# Patient Record
Sex: MALE | Race: WHITE | Employment: OTHER | URBAN - METROPOLITAN AREA
[De-identification: names, ages, dates, MRNs, and addresses within clinical notes are randomized per-mention and may not be internally consistent; named-entity substitution may affect disease eponyms.]

---

## 2020-08-10 ENCOUNTER — OFFICE VISIT (OUTPATIENT)
Dept: PRIMARY CARE CLINIC | Age: 65
End: 2020-08-10

## 2020-08-10 PROCEDURE — 99211 OFF/OP EST MAY X REQ PHY/QHP: CPT | Performed by: NURSE PRACTITIONER

## 2020-08-10 NOTE — PATIENT INSTRUCTIONS

## 2020-08-10 NOTE — PROGRESS NOTES
Manjit Suarez received a viral test for COVID-19. They were educated on isolation and quarantine as appropriate. For any symptoms, they were directed to seek care from their PCP, given contact information to establish with a doctor, directed to an urgent care or the emergency room.

## 2020-08-13 LAB
SARS-COV-2: NOT DETECTED
SOURCE: NORMAL

## 2020-08-17 ENCOUNTER — OFFICE VISIT (OUTPATIENT)
Dept: PRIMARY CARE CLINIC | Age: 65
End: 2020-08-17

## 2020-08-17 PROCEDURE — 99211 OFF/OP EST MAY X REQ PHY/QHP: CPT | Performed by: NURSE PRACTITIONER

## 2020-08-17 NOTE — PATIENT INSTRUCTIONS
Advance Care Planning  People with COVID-19 may have no symptoms, mild symptoms, such as fever, cough, and shortness of breath or they may have more severe illness, developing severe and fatal pneumonia. As a result, Advance Care Planning with attention to naming a health care decision maker (someone you trust to make healthcare decisions for you if you could not speak for yourself) and sharing other health care preferences is important BEFORE a possible health crisis. Please contact your Primary Care Provider to discuss Advance Care Planning. Preventing the Spread of Coronavirus Disease 2019 in Homes and Residential Communities  For the most recent information go to [email or web address]    Prevention steps for People with confirmed or suspected COVID-19 (including persons under investigation) who do not need to be hospitalized  and   People with confirmed COVID-19 who were hospitalized and determined to be medically stable to go home    Your healthcare provider and public health staff will evaluate whether you can be cared for at home. If it is determined that you do not need to be hospitalized and can be isolated at home, you will be monitored by staff from your local or state health department. You should follow the prevention steps below until a healthcare provider or local or state health department says you can return to your normal activities. Stay home except to get medical care  People who are mildly ill with COVID-19 are able to isolate at home during their illness. You should restrict activities outside your home, except for getting medical care. Do not go to work, school, or public areas. Avoid using public transportation, ride-sharing, or taxis. Separate yourself from other people and animals in your home  People: As much as possible, you should stay in a specific room and away from other people in your home.  Also, you should use a separate before eating or preparing food. If soap and water are not readily available, use an alcohol-based hand  with at least 60% alcohol, covering all surfaces of your hands and rubbing them together until they feel dry. Soap and water are the best option if hands are visibly dirty. Avoid touching your eyes, nose, and mouth with unwashed hands. Avoid sharing personal household items  You should not share dishes, drinking glasses, cups, eating utensils, towels, or bedding with other people or pets in your home. After using these items, they should be washed thoroughly with soap and water. Clean all high-touch surfaces everyday  High touch surfaces include counters, tabletops, doorknobs, bathroom fixtures, toilets, phones, keyboards, tablets, and bedside tables. Also, clean any surfaces that may have blood, stool, or body fluids on them. Use a household cleaning spray or wipe, according to the label instructions. Labels contain instructions for safe and effective use of the cleaning product including precautions you should take when applying the product, such as wearing gloves and making sure you have good ventilation during use of the product. Monitor your symptoms  Seek prompt medical attention if your illness is worsening (e.g., difficulty breathing). Before seeking care, call your healthcare provider and tell them that you have, or are being evaluated for, COVID-19. Put on a facemask before you enter the facility. These steps will help the healthcare providers office to keep other people in the office or waiting room from getting infected or exposed. Ask your healthcare provider to call the local or state health department. Persons who are placed under active monitoring or facilitated self-monitoring should follow instructions provided by their local health department or occupational health professionals, as appropriate. When working with your local health department check their available hours.   If you have a medical emergency and need to call 911, notify the dispatch personnel that you have, or are being evaluated for COVID-19. If possible, put on a facemask before emergency medical services arrive. Discontinuing home isolation  Patients with confirmed COVID-19 should remain under home isolation precautions until the risk of secondary transmission to others is thought to be low. The decision to discontinue home isolation precautions should be made on a case-by-case basis, in consultation with healthcare providers and state and local health departments.

## 2020-08-18 LAB — SARS-COV-2, NAA: NOT DETECTED

## 2020-11-03 ENCOUNTER — NURSE ONLY (OUTPATIENT)
Dept: INTERNAL MEDICINE CLINIC | Age: 65
End: 2020-11-03
Payer: COMMERCIAL

## 2020-11-03 PROCEDURE — 90694 VACC AIIV4 NO PRSRV 0.5ML IM: CPT | Performed by: INTERNAL MEDICINE

## 2020-11-03 PROCEDURE — 90471 IMMUNIZATION ADMIN: CPT | Performed by: INTERNAL MEDICINE

## 2020-11-03 NOTE — PROGRESS NOTES
Vaccine Information Sheet, \"Influenza - Inactivated\"  given to Gege Dempsey, or parent/legal guardian of  Gege Dempsey and verbalized understanding. Patient responses:    Have you ever had a reaction to a flu vaccine? No  Do you have any current illness? No  Have you ever had Guillian Philadelphia Syndrome? No  Do you have a serious allergy to any of the follow: Neomycin, Polymyxin, Thimerosal, eggs or egg products? No    Flu vaccine given per order. Please see immunization tab. Risks and benefits explained. Current VIS given.

## 2021-03-10 ENCOUNTER — OFFICE VISIT (OUTPATIENT)
Dept: PRIMARY CARE CLINIC | Age: 66
End: 2021-03-10
Payer: COMMERCIAL

## 2021-03-10 DIAGNOSIS — Z11.59 SCREENING FOR VIRAL DISEASE: Primary | ICD-10-CM

## 2021-03-10 PROCEDURE — 99211 OFF/OP EST MAY X REQ PHY/QHP: CPT | Performed by: NURSE PRACTITIONER

## 2021-03-10 NOTE — PROGRESS NOTES
Pedro Later received a viral test for COVID-19. They were educated on isolation and quarantine as appropriate. For any symptoms, they were directed to seek care from their PCP, given contact information to establish with a doctor, directed to an urgent care or the emergency room.

## 2021-03-10 NOTE — PATIENT INSTRUCTIONS

## 2021-03-11 LAB — SARS-COV-2: NOT DETECTED

## 2021-08-06 ENCOUNTER — TELEPHONE (OUTPATIENT)
Dept: INTERNAL MEDICINE CLINIC | Age: 66
End: 2021-08-06

## 2021-08-06 DIAGNOSIS — Z13.220 SCREENING FOR LIPOID DISORDERS: Primary | ICD-10-CM

## 2021-08-06 DIAGNOSIS — Z12.5 SPECIAL SCREENING FOR MALIGNANT NEOPLASM OF PROSTATE: ICD-10-CM

## 2021-08-06 DIAGNOSIS — K55.20 COLON AV MALFORMATION: ICD-10-CM

## 2021-08-06 NOTE — TELEPHONE ENCOUNTER
----- Message from Trista Broderick sent at 8/6/2021 12:28 PM EDT -----  Subject: Referral Request    QUESTIONS   Reason for referral request? Patient is scheduled with Dr. Latesha Tejeda and   would like to have his blood work completed prior to his appointment per   Dr. Ricardo Burleson. Please call Len Hoffman [phone]   Has the physician seen you for this condition before? No   Preferred Specialist (if applicable)? Do you already have an appointment scheduled? Additional Information for Provider?   ---------------------------------------------------------------------------  --------------  CALL BACK INFO  What is the best way for the office to contact you? OK to leave message on   voicemail  Preferred Call Back Phone Number?  [phone]

## 2021-08-06 NOTE — TELEPHONE ENCOUNTER
Dr. Ana Maria Mcnair am showing that this patient is not on Medicare but a commercial insurance plan. Therefore, I have kept him scheduled for a Physical with 60 min.   Also informed patient that his blood work orders are in [facility]

## 2021-08-06 NOTE — TELEPHONE ENCOUNTER
Please call patient. Order for fasting laboratory studies in epic obtain a few days before the office visit.   At the age of 77 is he eligible for a \"history and physical\" or only atypical routine Medicare checkup for which he does not need an hour but rather suggest 40 min at the most  Dr. Dagoberto Taylor

## 2021-09-22 ENCOUNTER — HOSPITAL ENCOUNTER (OUTPATIENT)
Age: 66
Discharge: HOME | End: 2021-09-22
Payer: COMMERCIAL

## 2021-09-22 DIAGNOSIS — Z12.5 SPECIAL SCREENING FOR MALIGNANT NEOPLASM OF PROSTATE: ICD-10-CM

## 2021-09-22 DIAGNOSIS — K55.20 COLON AV MALFORMATION: ICD-10-CM

## 2021-09-22 DIAGNOSIS — Z13.220 SCREENING FOR LIPOID DISORDERS: ICD-10-CM

## 2021-09-22 LAB
A/G RATIO: 1.3 (ref 1.1–2.2)
ALBUMIN SERPL-MCNC: 4.2 G/DL (ref 3.4–5)
ALP BLD-CCNC: 124 U/L (ref 40–129)
ALT SERPL-CCNC: 44 U/L (ref 10–40)
ANION GAP SERPL CALCULATED.3IONS-SCNC: 15 MMOL/L (ref 3–16)
AST SERPL-CCNC: 46 U/L (ref 15–37)
BILIRUB SERPL-MCNC: 0.9 MG/DL (ref 0–1)
BUN BLDV-MCNC: 12 MG/DL (ref 7–20)
CALCIUM SERPL-MCNC: 9.7 MG/DL (ref 8.3–10.6)
CHLORIDE BLD-SCNC: 99 MMOL/L (ref 99–110)
CHOLESTEROL, TOTAL: 282 MG/DL (ref 0–199)
CO2: 25 MMOL/L (ref 21–32)
CREAT SERPL-MCNC: 0.8 MG/DL (ref 0.8–1.3)
GFR AFRICAN AMERICAN: >60
GFR NON-AFRICAN AMERICAN: >60
GLOBULIN: 3.3 G/DL
GLUCOSE BLD-MCNC: 125 MG/DL (ref 70–99)
HCT VFR BLD CALC: 46.7 % (ref 40.5–52.5)
HDLC SERPL-MCNC: 71 MG/DL (ref 40–60)
HEMOGLOBIN: 16 G/DL (ref 13.5–17.5)
LDL CHOLESTEROL CALCULATED: 174 MG/DL
MCH RBC QN AUTO: 33.9 PG (ref 26–34)
MCHC RBC AUTO-ENTMCNC: 34.3 G/DL (ref 31–36)
MCV RBC AUTO: 98.9 FL (ref 80–100)
PDW BLD-RTO: 12.6 % (ref 12.4–15.4)
PLATELET # BLD: 232 K/UL (ref 135–450)
PMV BLD AUTO: 8 FL (ref 5–10.5)
POTASSIUM SERPL-SCNC: 4.5 MMOL/L (ref 3.5–5.1)
PROSTATE SPECIFIC ANTIGEN: 2.93 NG/ML (ref 0–4)
RBC # BLD: 4.72 M/UL (ref 4.2–5.9)
SODIUM BLD-SCNC: 139 MMOL/L (ref 136–145)
TOTAL PROTEIN: 7.5 G/DL (ref 6.4–8.2)
TRIGL SERPL-MCNC: 187 MG/DL (ref 0–150)
VLDLC SERPL CALC-MCNC: 37 MG/DL
WBC # BLD: 7.2 K/UL (ref 4–11)

## 2021-09-22 PROCEDURE — 36415 COLL VENOUS BLD VENIPUNCTURE: CPT

## 2021-09-22 PROCEDURE — 80053 COMPREHEN METABOLIC PANEL: CPT

## 2021-09-22 PROCEDURE — 80061 LIPID PANEL: CPT

## 2021-09-22 PROCEDURE — 85027 COMPLETE CBC AUTOMATED: CPT

## 2021-09-22 PROCEDURE — 84153 ASSAY OF PSA TOTAL: CPT

## 2021-10-11 ENCOUNTER — HOSPITAL ENCOUNTER (OUTPATIENT)
Age: 66
Discharge: HOME | End: 2021-10-11
Payer: COMMERCIAL

## 2021-10-11 ENCOUNTER — OFFICE VISIT (OUTPATIENT)
Dept: INTERNAL MEDICINE CLINIC | Age: 66
End: 2021-10-11
Payer: COMMERCIAL

## 2021-10-11 VITALS
BODY MASS INDEX: 31.86 KG/M2 | OXYGEN SATURATION: 98 % | HEART RATE: 100 BPM | SYSTOLIC BLOOD PRESSURE: 134 MMHG | DIASTOLIC BLOOD PRESSURE: 86 MMHG | HEIGHT: 67 IN | WEIGHT: 203 LBS

## 2021-10-11 DIAGNOSIS — R53.83 FATIGUE, UNSPECIFIED TYPE: ICD-10-CM

## 2021-10-11 DIAGNOSIS — Z23 NEED FOR PROPHYLACTIC VACCINATION AGAINST STREPTOCOCCUS PNEUMONIAE (PNEUMOCOCCUS): ICD-10-CM

## 2021-10-11 DIAGNOSIS — N52.9 ERECTILE DYSFUNCTION, UNSPECIFIED ERECTILE DYSFUNCTION TYPE: ICD-10-CM

## 2021-10-11 DIAGNOSIS — Z23 NEED FOR INFLUENZA VACCINATION: ICD-10-CM

## 2021-10-11 DIAGNOSIS — R73.01 IFG (IMPAIRED FASTING GLUCOSE): ICD-10-CM

## 2021-10-11 DIAGNOSIS — Z00.00 ANNUAL PHYSICAL EXAM: Primary | ICD-10-CM

## 2021-10-11 LAB
BILIRUBIN, POC: ABNORMAL
BLOOD URINE, POC: NEGATIVE
CLARITY, POC: CLEAR
COLOR, POC: ABNORMAL
GLUCOSE URINE, POC: NEGATIVE
KETONES, POC: ABNORMAL
LEUKOCYTE EST, POC: NEGATIVE
NITRITE, POC: NEGATIVE
PH, POC: 6
PROTEIN, POC: + 30
SPECIFIC GRAVITY, POC: 1.02
UROBILINOGEN, POC: NORMAL

## 2021-10-11 PROCEDURE — 84403 ASSAY OF TOTAL TESTOSTERONE: CPT

## 2021-10-11 PROCEDURE — 90732 PPSV23 VACC 2 YRS+ SUBQ/IM: CPT | Performed by: INTERNAL MEDICINE

## 2021-10-11 PROCEDURE — 81002 URINALYSIS NONAUTO W/O SCOPE: CPT | Performed by: INTERNAL MEDICINE

## 2021-10-11 PROCEDURE — 84436 ASSAY OF TOTAL THYROXINE: CPT

## 2021-10-11 PROCEDURE — 36415 COLL VENOUS BLD VENIPUNCTURE: CPT

## 2021-10-11 PROCEDURE — 90694 VACC AIIV4 NO PRSRV 0.5ML IM: CPT | Performed by: INTERNAL MEDICINE

## 2021-10-11 PROCEDURE — 99397 PER PM REEVAL EST PAT 65+ YR: CPT | Performed by: INTERNAL MEDICINE

## 2021-10-11 PROCEDURE — 90471 IMMUNIZATION ADMIN: CPT | Performed by: INTERNAL MEDICINE

## 2021-10-11 PROCEDURE — 90472 IMMUNIZATION ADMIN EACH ADD: CPT | Performed by: INTERNAL MEDICINE

## 2021-10-11 PROCEDURE — 84443 ASSAY THYROID STIM HORMONE: CPT

## 2021-10-11 RX ORDER — SILDENAFIL 50 MG/1
TABLET, FILM COATED ORAL
Qty: 10 TABLET | Refills: 1 | Status: SHIPPED | OUTPATIENT
Start: 2021-10-11 | End: 2022-02-01

## 2021-10-11 SDOH — ECONOMIC STABILITY: FOOD INSECURITY: WITHIN THE PAST 12 MONTHS, THE FOOD YOU BOUGHT JUST DIDN'T LAST AND YOU DIDN'T HAVE MONEY TO GET MORE.: NEVER TRUE

## 2021-10-11 SDOH — ECONOMIC STABILITY: FOOD INSECURITY: WITHIN THE PAST 12 MONTHS, YOU WORRIED THAT YOUR FOOD WOULD RUN OUT BEFORE YOU GOT MONEY TO BUY MORE.: NEVER TRUE

## 2021-10-11 ASSESSMENT — PATIENT HEALTH QUESTIONNAIRE - PHQ9
SUM OF ALL RESPONSES TO PHQ QUESTIONS 1-9: 0
1. LITTLE INTEREST OR PLEASURE IN DOING THINGS: 0
SUM OF ALL RESPONSES TO PHQ QUESTIONS 1-9: 0
SUM OF ALL RESPONSES TO PHQ9 QUESTIONS 1 & 2: 0
SUM OF ALL RESPONSES TO PHQ QUESTIONS 1-9: 0
2. FEELING DOWN, DEPRESSED OR HOPELESS: 0

## 2021-10-11 ASSESSMENT — SOCIAL DETERMINANTS OF HEALTH (SDOH): HOW HARD IS IT FOR YOU TO PAY FOR THE VERY BASICS LIKE FOOD, HOUSING, MEDICAL CARE, AND HEATING?: NOT HARD AT ALL

## 2021-10-11 NOTE — PROGRESS NOTES
History and Physical      Elma Brito  YOB: 1955    Date of Service:  10/11/2021    Chief Complaint:   Elma Brito is a 74 y.o. male who presents for complete physical examination. HPI:  Patient here for history physical exam.  Review last office visit with me: 11/9/2016. Reviewed colonoscopy most recent 11/12/2018 notable for polyps, tubular adenoma. Diverticulosis. AV malformation redo 5 years Dr. Soha Pinedo,  Health maintenance: Hepatitis C, shingles, influenza vaccine, pneumonia 23, Covid vaccine. Reviewed laboratory data 9/22/2021: Chemistry panel: Glucose: 125. ALT: 44. AST: 46. Total cholesterol: 282. LDL cholesterol 174. CBC unremarkable. PSA 2.93. Patient complains of a decrease in energy in the morning at times will fall asleep watching TV in the evening go to bed around midnight up about 7:15 AM.  Back to bed about 930 not really sleeping he states. Patient states he does snore uses an over-the-counter \"Snore Stop\". Denies apneic spells. Denies daytime sleepiness. Patient does drink 2 glasses of wine in the evening. Complains of erectile dysfunction: Has had difficulty with lack of firmness now seems to be worse over the last 6 to 9 months. Of significance is alcohol consumption in the evening. Patient feels his libido is intact.     Wt Readings from Last 3 Encounters:   10/11/21 203 lb (92.1 kg)   11/09/16 194 lb 9.6 oz (88.3 kg)   06/01/16 191 lb (86.6 kg)     BP Readings from Last 3 Encounters:   10/11/21 134/86   11/09/16 128/74   06/01/16 122/78       Patient Active Problem List   Diagnosis    FH: colon cancer    S/P tonsillectomy    Other affections of shoulder region, not elsewhere classified    Chronic back pain       Preventive Care:  Health Maintenance   Topic Date Due    Hepatitis C screen  Never done    Shingles Vaccine (1 of 2) Never done    Diabetes screen  07/30/2018    DTaP/Tdap/Td vaccine (2 - Td or Tdap) 06/01/2026    Lipid screen 09/22/2026    Colon cancer screen colonoscopy  11/12/2028    Flu vaccine  Completed    Pneumococcal 65+ years Vaccine  Completed    COVID-19 Vaccine  Completed    Hepatitis A vaccine  Aged Out    Hepatitis B vaccine  Aged Out    Hib vaccine  Aged Out    Meningococcal (ACWY) vaccine  Aged Out      Self-testicular exams:  n  Sexual activity: Y  Last eye exam:  2021  Exercise:  n  Seatbelt use:  y  Lipid panel:    Lab Results   Component Value Date    CHOL 282 (H) 09/22/2021    TRIG 187 (H) 09/22/2021    HDL 71 (H) 09/22/2021    LDLCALC 174 (H) 09/22/2021       Living will:  n      Immunization History   Administered Date(s) Administered    COVID-19, Pfizer, PF, 30mcg/0.3mL 02/20/2021, 03/13/2021    Influenza Virus Vaccine 11/20/2015    Influenza, Roj[place], IM, (6 mo and older Fluzone, Flulaval, Fluarix and 3 yrs and older Afluria) 11/09/2016    Influenza, Quadv, adjuvanted, 65 yrs +, IM, PF (Fluad) 11/03/2020, 10/11/2021    Pneumococcal Polysaccharide ([id number]) 10/11/2021    Tdap (Boostrix, Adacel) 06/01/2016       No Known Allergies  Outpatient Medications Marked as Taking for the 10/11/21 encounter (Office Visit) with Bee Miller MD   Medication Sig Dispense Refill    sildenafil (VIAGRA) 50 MG tablet 1 to 2 pills as needed for ED 10 tablet 1    folic acid (FOLVITE) 785 MCG tablet Take 400 mcg by mouth daily.  vitamin B-12 (CYANOCOBALAMIN) 1000 MCG tablet Take 1,000 mcg by mouth daily.  Echinacea 400 MG CAPS Take  by mouth.  Ascorbic Acid (VITAMIN C) 500 MG tablet Take 1,000 mg by mouth daily. Past Medical History:   Diagnosis Date    Chronic back pain     Sees Chirop    Colonic polyp 2003     Past Surgical History:   Procedure Laterality Date    COLONOSCOPY  2003,2006    COLONOSCOPY  08/03/2012    Diverticulosis A-V malformation Redo 5 yrs    COLONOSCOPY N/A 11/12/2018    Dr. Yogesh Zaragoza. Tubular adenoma.   Redo 5 years     Family History   Problem Relation Age of Onset  Colon Cancer Father      Social History     Socioeconomic History    Marital status:      Spouse name: Vijaya Moody Number of children: 3    Years of education: Not on file    Highest education level: Not on file   Occupational History    Occupation:      Comment: Insurance    Tobacco Use    Smoking status: Never Smoker    Smokeless tobacco: Never Used   Substance and Sexual Activity    Alcohol use: Yes     Alcohol/week: 2.0 standard drinks     Types: 2 Glasses of wine per week    Drug use: Not on file    Sexual activity: Not on file   Other Topics Concern    Not on file   Social History Narrative    Not on file     Social Determinants of Health     Financial Resource Strain: Low Risk     Difficulty of Paying Living Expenses: Not hard at all   Food Insecurity: No Food Insecurity    Worried About [address] in the Last Year: Never true    [address] in the Last Year: Never true   Transportation Needs:     Lack of Transportation (Medical):      Lack of Transportation (Non-Medical):    Physical Activity:     Days of Exercise per Week:     Minutes of Exercise per Session:    Stress:     Feeling of Stress :    Social Connections:     Frequency of Communication with Friends and Family:     Frequency of Social Gatherings with Friends and Family:     Attends [employer] Services:     Active Member of Clubs or Organizations:     Attends Club or Organization Meetings:     Marital Status:    Intimate Partner Violence:     Fear of Current or Ex-Partner:     Emotionally Abused:     Physically Abused:     Sexually Abused:        Review of Systems:  Constitutional: negative  HENT: negative  EYES:  Closed Angle glaucoma Laser Tx  Respiratory: negative  Cardiovasular :Negative  Gastrointestinal: negative  Endocrine: negative  Musculoskeletal: negative  Skin: negative  Allergic/Immunological: negative  Hematological: negative  Psychiatric/Behavorial: negative  : ED  Renal: negative  CNS: negative    Physical Exam:   Vitals:    10/11/21 1306   BP: 134/86   Site: Right Upper Arm   Position: Sitting   Cuff Size: Large Adult   Pulse: 100   SpO2: 98%   Weight: 203 lb (92.1 kg)   Height: 5' 7\" (1.702 m)     Body mass index is 31.79 kg/m². Constitutional: He is oriented to person, place, and time. He appears well-developed and well-nourished. No distress. HEENT:   Head: Normocephalic and atraumatic. Right Ear: Tympanic membrane, external ear and ear canal normal.   Left Ear: Tympanic membrane, external ear and ear canal normal.   Nose: Nose normal.   Mouth/Throat: Oropharynx is clear and moist and mucous membranes are normal. No oropharyngeal exudate or posterior oropharyngeal erythema. He has no cervical adenopathy. Eyes: Conjunctivae and extraocular motions are normal. Pupils are equal, round, and reactive to light. Neck: Full passive range of motion without pain. Neck supple. No JVD present. Carotid bruit is not present. No mass and no thyromegaly present. Cardiovascular: Normal rate, regular rhythm, normal heart sounds and intact distal pulses. Exam reveals no gallop and no friction rub. DURGA murmur heard. Pulmonary/Chest: Effort normal and breath sounds normal. No respiratory distress. He has no wheezes, rhonchi or rales. Abdominal: Midline diastases. Not tender. Obese. Soft, non-tender. Bowel sounds and aorta are normal. There is no organomegaly, mass or bruit. Genitourinary: Not examined. Musculoskeletal: Normal range of motion, no synovitis. He exhibits bilateral ankle edema. Neurological: He is alert and oriented to person, place, and time. He has normal reflexes. No cranial nerve deficit. Coordination normal.   Skin: Skin is warm, dry and intact. No suspicious lesions are noted. Psychiatric: He has a normal mood and affect.  His speech is normal and behavior is normal. Judgment, cognition and memory are normal.   Testing: Urinalysis: +30 protein, small bilirubin trace ketones otherwise unremarkable. Results for orders placed or performed in visit on 10/11/21   POCT Urinalysis no Micro   Result Value Ref Range    Color, UA YAHAIRA     Clarity, UA CLEAR     Glucose, UA POC NEGATIVE     Bilirubin, UA SMALL     Ketones, UA TRACE     Spec Grav, UA 1.025     Blood, UA POC NEGATIVE     pH, UA 6.0     Protein, UA POC + 30     Urobilinogen, UA NORMAL     Leukocytes, UA NEGATIVE     Nitrite, UA NEGATIVE      Assessment/Plan:  Patient Active Problem List   Diagnosis    FH: colon cancer    S/P tonsillectomy    Other affections of shoulder region, not elsewhere classified    Chronic back pain     Kamilla Samano was seen today for annual exam.    Diagnoses and all orders for this visit:    Annual physical exam  -     POCT Urinalysis no Micro: Performed in office. Unremarkable. Erectile dysfunction, unspecified erectile dysfunction type  -     Testosterone male; Future call me for results  -     sildenafil (VIAGRA) 50 MG tablet; 1 to 2 pills as needed for ED discussed taking Viagra before encountering sexual stimulation and discussed window opportunity and possible side effects    Fatigue, unspecified type:                Evaluate for fatigue. Patient declined sleep study.  -     T4; Future  -     TSH with Reflex; Future    IFG (impaired fasting glucose)  -     Hemoglobin A1C; Future  -     Lipid Panel; Future  -     Comprehensive Metabolic Panel; Future            Discussed low sugar low-carb weight reduction diet and referral to nurse practitioner for teaching/instruction    Need for influenza vaccination                 Health maintenance. Influenza vaccine given today    Need for prophylactic vaccination against Streptococcus pneumoniae (pneumococcus)               Health maintenance.   -     Pneumococcal polysaccharide vaccine 23-valent greater than or equal to 1yo subcutaneous/IM    Other orders  -     INFLUENZA, QUADV, ADJUVANTED, 65 YRS =, IM, PF, PREFILL SYR, 0.5ML (FLUAD)

## 2021-10-12 ENCOUNTER — TELEPHONE (OUTPATIENT)
Dept: INTERNAL MEDICINE CLINIC | Age: 66
End: 2021-10-12

## 2021-10-12 LAB
T4 TOTAL: 6 UG/DL (ref 4.5–10.9)
TSH REFLEX: 1.31 UIU/ML (ref 0.27–4.2)

## 2021-10-13 NOTE — TELEPHONE ENCOUNTER
Please call patient. From 10/11/2021 labs. Thyroid function test were normal.  Please check on testosterone level result?   Dr. Jacey Leal

## 2021-10-14 LAB — TESTOSTERONE TOTAL: 366 NG/DL (ref 220–1000)

## 2022-02-01 DIAGNOSIS — N52.9 ERECTILE DYSFUNCTION, UNSPECIFIED ERECTILE DYSFUNCTION TYPE: ICD-10-CM

## 2022-02-01 RX ORDER — SILDENAFIL 50 MG/1
TABLET, FILM COATED ORAL
Qty: 10 TABLET | Refills: 1 | Status: SHIPPED | OUTPATIENT
Start: 2022-02-01 | End: 2022-09-26

## 2022-02-01 NOTE — TELEPHONE ENCOUNTER
Refill request for Sildenafil medication.      Name of Aster      Last visit - 10/11/21     Pending visit - none    Last refill -10/11/21      Medication Contract signed -   Last Oarrs ran-         Additional Comments

## 2022-09-25 DIAGNOSIS — N52.9 ERECTILE DYSFUNCTION, UNSPECIFIED ERECTILE DYSFUNCTION TYPE: ICD-10-CM

## 2022-09-26 RX ORDER — SILDENAFIL 50 MG/1
TABLET, FILM COATED ORAL
Qty: 10 TABLET | Refills: 0 | Status: SHIPPED | OUTPATIENT
Start: 2022-09-26

## 2022-09-26 NOTE — TELEPHONE ENCOUNTER
Refill request for sildenafil medication.      Name of [facility]- [facility]      Last visit - 10/11/21     Pending visit - none    Last refill -2/1/22      Medication Contract signed -   Last Oarrs ran-         Additional Comments

## 2023-05-18 ENCOUNTER — OFFICE VISIT (OUTPATIENT)
Dept: INTERNAL MEDICINE CLINIC | Age: 68
End: 2023-05-18
Payer: COMMERCIAL

## 2023-05-18 VITALS
DIASTOLIC BLOOD PRESSURE: 78 MMHG | HEART RATE: 99 BPM | OXYGEN SATURATION: 97 % | WEIGHT: 211 LBS | HEIGHT: 67 IN | SYSTOLIC BLOOD PRESSURE: 124 MMHG | TEMPERATURE: 97.5 F | BODY MASS INDEX: 33.12 KG/M2

## 2023-05-18 DIAGNOSIS — E66.9 CLASS 1 OBESITY WITHOUT SERIOUS COMORBIDITY WITH BODY MASS INDEX (BMI) OF 33.0 TO 33.9 IN ADULT, UNSPECIFIED OBESITY TYPE: ICD-10-CM

## 2023-05-18 DIAGNOSIS — Z00.00 ENCOUNTER FOR WELL ADULT EXAM WITHOUT ABNORMAL FINDINGS: Primary | ICD-10-CM

## 2023-05-18 DIAGNOSIS — E78.2 MIXED HYPERLIPIDEMIA: ICD-10-CM

## 2023-05-18 DIAGNOSIS — Z00.00 PREVENTATIVE HEALTH CARE: ICD-10-CM

## 2023-05-18 DIAGNOSIS — R73.01 IFG (IMPAIRED FASTING GLUCOSE): ICD-10-CM

## 2023-05-18 DIAGNOSIS — R06.83 SNORING: ICD-10-CM

## 2023-05-18 DIAGNOSIS — N52.9 ERECTILE DYSFUNCTION, UNSPECIFIED ERECTILE DYSFUNCTION TYPE: ICD-10-CM

## 2023-05-18 PROCEDURE — 99397 PER PM REEVAL EST PAT 65+ YR: CPT | Performed by: STUDENT IN AN ORGANIZED HEALTH CARE EDUCATION/TRAINING PROGRAM

## 2023-05-18 RX ORDER — TADALAFIL 10 MG/1
10 TABLET ORAL PRN
Qty: 30 TABLET | Refills: 3 | Status: SHIPPED | OUTPATIENT
Start: 2023-05-18

## 2023-05-18 SDOH — ECONOMIC STABILITY: HOUSING INSECURITY
IN THE LAST 12 MONTHS, WAS THERE A TIME WHEN YOU DID NOT HAVE A STEADY PLACE TO SLEEP OR SLEPT IN A SHELTER (INCLUDING NOW)?: NO

## 2023-05-18 SDOH — ECONOMIC STABILITY: INCOME INSECURITY: HOW HARD IS IT FOR YOU TO PAY FOR THE VERY BASICS LIKE FOOD, HOUSING, MEDICAL CARE, AND HEATING?: NOT HARD AT ALL

## 2023-05-18 SDOH — ECONOMIC STABILITY: FOOD INSECURITY: WITHIN THE PAST 12 MONTHS, YOU WORRIED THAT YOUR FOOD WOULD RUN OUT BEFORE YOU GOT MONEY TO BUY MORE.: NEVER TRUE

## 2023-05-18 SDOH — ECONOMIC STABILITY: FOOD INSECURITY: WITHIN THE PAST 12 MONTHS, THE FOOD YOU BOUGHT JUST DIDN'T LAST AND YOU DIDN'T HAVE MONEY TO GET MORE.: NEVER TRUE

## 2023-05-18 ASSESSMENT — PATIENT HEALTH QUESTIONNAIRE - PHQ9
SUM OF ALL RESPONSES TO PHQ9 QUESTIONS 1 & 2: 0
2. FEELING DOWN, DEPRESSED OR HOPELESS: 0
1. LITTLE INTEREST OR PLEASURE IN DOING THINGS: 0
SUM OF ALL RESPONSES TO PHQ QUESTIONS 1-9: 0

## 2023-05-18 NOTE — PROGRESS NOTES
Well Adult Note  Name: Alcira Haas Date: 2023   MRN: [id number] Sex: Male   Age: 69 y.o. Ethnicity: Non- / Non    : 1955 Race: White (non-)      Sherryle Bucco is here for well adult exam.  History:  -Patient is a 64-year-old male presenting to establish care with me. He does not have any significant past medical history. Does have obesity and has not obtained labs for at least 1 year. On previous lab review noted hyperlipidemia but he is not on any medications. Does snore and feels tired occasionally in the morning but has not obtained a sleep study yet. He is on Viagra and has noted weaning effects on it. Has tried Cialis in the past with good response. He needs refills on it. Review of Systems  14 point ROS negative except as above  No Known Allergies      Prior to Visit Medications    Medication Sig Taking? Authorizing Provider   tadalafil (CIALIS) 10 MG tablet Take 1 tablet by mouth as needed for Erectile Dysfunction Yes Solange Cornelius MD   triamcinolone (KENALOG) 0.1 % cream Apply topically 2 times daily. Patient not taking: Reported on 10/11/2021  PORTILLO Montesinos - CNP   folic acid (FOLVITE) 418 MCG tablet Take 400 mcg by mouth daily. Historical Provider, MD   vitamin B-12 (CYANOCOBALAMIN) 1000 MCG tablet Take 1,000 mcg by mouth daily. Historical Provider, MD   GINKGO 60 MG TABS Take  by mouth. Patient not taking: Reported on 10/11/2021  Historical Provider, MD   Echinacea 400 MG CAPS Take  by mouth. Historical Provider, MD   Ascorbic Acid (VITAMIN C) 500 MG tablet Take 1,000 mg by mouth daily. Historical Provider, MD         Past Medical History:   Diagnosis Date    Chronic back pain     Sees Chirop    Colonic polyp        Past Surgical History:   Procedure Laterality Date    COLONOSCOPY  ,    COLONOSCOPY  2012    Diverticulosis A-V malformation Redo 5 yrs    COLONOSCOPY N/A 2018    Dr. Snehal Frey.   Tubular

## 2023-07-07 ENCOUNTER — TELEPHONE (OUTPATIENT)
Dept: INTERNAL MEDICINE CLINIC | Age: 68
End: 2023-07-07

## 2023-07-07 NOTE — TELEPHONE ENCOUNTER
Patient's insurance [employer]qu[employer] is calling today stating there needs to be a prior authorization done.     The number to call for the PA is [phone]

## 2023-08-21 ENCOUNTER — OFFICE VISIT (OUTPATIENT)
Dept: PULMONOLOGY | Age: 68
End: 2023-08-21
Payer: COMMERCIAL

## 2023-08-21 VITALS
HEIGHT: 67 IN | DIASTOLIC BLOOD PRESSURE: 74 MMHG | HEART RATE: 96 BPM | OXYGEN SATURATION: 96 % | SYSTOLIC BLOOD PRESSURE: 124 MMHG | WEIGHT: 200 LBS | BODY MASS INDEX: 31.39 KG/M2 | RESPIRATION RATE: 16 BRPM

## 2023-08-21 DIAGNOSIS — R53.83 OTHER FATIGUE: ICD-10-CM

## 2023-08-21 DIAGNOSIS — E66.9 OBESITY (BMI 30-39.9): ICD-10-CM

## 2023-08-21 DIAGNOSIS — R06.83 SNORING: Primary | ICD-10-CM

## 2023-08-21 DIAGNOSIS — G47.30 OBSERVED SLEEP APNEA: ICD-10-CM

## 2023-08-21 PROCEDURE — 99204 OFFICE O/P NEW MOD 45 MIN: CPT | Performed by: NURSE PRACTITIONER

## 2023-08-21 RX ORDER — SOD SULF/POT CHLORIDE/MAG SULF 1.479 G
TABLET ORAL
COMMUNITY
Start: 2023-07-24

## 2023-08-21 ASSESSMENT — SLEEP AND FATIGUE QUESTIONNAIRES
ESS TOTAL SCORE: 0
HOW LIKELY ARE YOU TO NOD OFF OR FALL ASLEEP WHILE SITTING AND READING: 0
HOW LIKELY ARE YOU TO NOD OFF OR FALL ASLEEP WHILE SITTING AND TALKING TO SOMEONE: 0
HOW LIKELY ARE YOU TO NOD OFF OR FALL ASLEEP IN A CAR, WHILE STOPPED FOR A FEW MINUTES IN TRAFFIC: 0
HOW LIKELY ARE YOU TO NOD OFF OR FALL ASLEEP WHILE WATCHING TV: 0
HOW LIKELY ARE YOU TO NOD OFF OR FALL ASLEEP WHILE SITTING QUIETLY AFTER LUNCH WITHOUT ALCOHOL: 0
NECK CIRCUMFERENCE (INCHES): 17
HOW LIKELY ARE YOU TO NOD OFF OR FALL ASLEEP WHILE LYING DOWN TO REST IN THE AFTERNOON WHEN CIRCUMSTANCES PERMIT: 0
HOW LIKELY ARE YOU TO NOD OFF OR FALL ASLEEP WHEN YOU ARE A PASSENGER IN A CAR FOR AN HOUR WITHOUT A BREAK: 0
HOW LIKELY ARE YOU TO NOD OFF OR FALL ASLEEP WHILE SITTING INACTIVE IN A PUBLIC PLACE: 0

## 2023-08-21 NOTE — PROGRESS NOTES
Patient ID: Shani Gonsales is a 69 y.o. male who is being seen today for   Chief Complaint   Patient presents with    New Patient     Referred by Radha     Sngabe     Referring: Dr. Juanjo Ybarra    HPI:   Shani Gonsales is a 69 y.o. male in office for snoring evaluation. Patient reports snoring at night for the past 10 years, better with \"snore stop spray\". States tried mouthguard for snoring but that did not help. Worse in supine position. Wakes self snoring. Has witnessed apnea. Mostly restorative sleep. +dry mouth upon awakening. Some fatigue and tiredness during the day. No history of sleep apnea. Bedtime 1030-11 pm and rise time is 930-10 am. It takes few minutes to fall asleep. 0-1 nocturia. Wakes up 1 times at night. It takes few minutes to fall back a sleep. Takes no nap during the day. No headache in am. No car wrecks or near wrecks because of the sleepiness. No nodding off while driving. Gained 30 pounds in the past 1-2 years. No forgetfulness or decreased concentration. at night. Drinks 0-1 caffinated beverages per day. 1-2 glass of wine a day. No restless feelings in legs at night. No loss of muscle strength when angry or laugh. No hallucination when dozing off or waking up from sleep. No paralysis upon awakening from sleep or going to sleep. No teeth grinding. No nightmares. No sleep walking. No night time panic attacks. No narcotics. No drug abuse. No history of depression. No history of anxiety. No history of atrial fibrillation. No history of DM. No history of HTN. No history of ischemic heart disease. No history of stroke. ESS is 0 . No smoking. No known FH for RLS or narcolepsy.  +FH for ANGELY- sister    Sleep Medicine 8/21/2023   Sitting and reading 0   Watching TV 0   Sitting, inactive in a public place (e.g. a theatre or a meeting) 0   As a passenger in a car for an hour without a break 0   Lying down to rest in the afternoon when circumstances permit 0   Sitting and talking to

## 2023-08-21 NOTE — PATIENT INSTRUCTIONS
[place]  Oxygen/[phone]  [address]  [facility], [address] [address]  Oxygen/PAP [address], [address]  **[place] inside [facility]  ([address])  Oxygen/PAP/NIV   [phone]  [phone] [address] [place], [address] [facility]  **[address] [facility]  Oxygen/ [facility] [address] [facility]  [phone] [address]   [address]  [place], [address] [facility]  **NE of [address] [place]  [phone] [address]  [place], [address]   Patient Aids -- [facility]   Oxygen/PAP/NIV [phone] [address] [place] [address]  [facility], [address]   Patient Aids Jackie Pert  Oxygen/[phone]  Option 4 [phone] [address]  Jackie Pert, [address] [place]   [facility] [phone] [facility] [address] [place], [place], [address] [facility]  Oxygen/PAP/NIV [place] [address]  Oxygen/PAP/NIV [facility] [address] [facility], [address]  Oxygen/PAP/NIV [address] [facility] [address]  [phone] [address] [place], [address]    [place] Sport  Oxygen/PAP/ [address] [facility] [address], [address]   [facility]  (formerly [facility])  CPAP only [address], [address]  **[address] [facility], [phone]    1-800 CPAP (store) [address]  [phone]    [id number] [facility] [address]     [facility] [place]

## 2023-08-29 ENCOUNTER — HOSPITAL ENCOUNTER (OUTPATIENT)
Dept: SLEEP CENTER | Age: 68
Discharge: HOME | End: 2023-08-31
Payer: COMMERCIAL

## 2023-08-29 DIAGNOSIS — G47.30 OBSERVED SLEEP APNEA: ICD-10-CM

## 2023-08-29 DIAGNOSIS — R53.83 OTHER FATIGUE: ICD-10-CM

## 2023-08-29 DIAGNOSIS — R06.83 SNORING: ICD-10-CM

## 2023-08-29 DIAGNOSIS — E66.9 OBESITY (BMI 30-39.9): ICD-10-CM

## 2023-08-30 PROCEDURE — 95806 SLEEP STUDY UNATT&RESP EFFT: CPT

## 2023-08-31 ENCOUNTER — HOSPITAL ENCOUNTER (OUTPATIENT)
Age: 68
Discharge: HOME | End: 2023-08-31
Payer: COMMERCIAL

## 2023-08-31 DIAGNOSIS — R73.01 IFG (IMPAIRED FASTING GLUCOSE): ICD-10-CM

## 2023-08-31 DIAGNOSIS — Z00.00 PREVENTATIVE HEALTH CARE: ICD-10-CM

## 2023-08-31 DIAGNOSIS — E78.2 MIXED HYPERLIPIDEMIA: ICD-10-CM

## 2023-08-31 LAB
ALBUMIN SERPL-MCNC: 4.3 G/DL (ref 3.4–5)
ALBUMIN/GLOB SERPL: 1.4 {RATIO} (ref 1.1–2.2)
ALP SERPL-CCNC: 126 U/L (ref 40–129)
ALT SERPL-CCNC: 38 U/L (ref 10–40)
ANION GAP SERPL CALCULATED.3IONS-SCNC: 13 MMOL/L (ref 3–16)
AST SERPL-CCNC: 47 U/L (ref 15–37)
BASOPHILS # BLD: 0.1 K/UL (ref 0–0.2)
BASOPHILS NFR BLD: 1 %
BILIRUB SERPL-MCNC: 0.4 MG/DL (ref 0–1)
BUN SERPL-MCNC: 10 MG/DL (ref 7–20)
CALCIUM SERPL-MCNC: 9.5 MG/DL (ref 8.3–10.6)
CHLORIDE SERPL-SCNC: 103 MMOL/L (ref 99–110)
CHOLEST SERPL-MCNC: 257 MG/DL (ref 0–199)
CO2 SERPL-SCNC: 25 MMOL/L (ref 21–32)
CREAT SERPL-MCNC: 0.6 MG/DL (ref 0.8–1.3)
DEPRECATED RDW RBC AUTO: 13.2 % (ref 12.4–15.4)
EOSINOPHIL # BLD: 0.3 K/UL (ref 0–0.6)
EOSINOPHIL NFR BLD: 4.5 %
GFR SERPLBLD CREATININE-BSD FMLA CKD-EPI: >60 ML/MIN/{1.73_M2}
GLUCOSE SERPL-MCNC: 127 MG/DL (ref 70–99)
HCT VFR BLD AUTO: 44.5 % (ref 40.5–52.5)
HDLC SERPL-MCNC: 74 MG/DL (ref 40–60)
HGB BLD-MCNC: 15.3 G/DL (ref 13.5–17.5)
LDLC SERPL CALC-MCNC: 158 MG/DL
LYMPHOCYTES # BLD: 1.5 K/UL (ref 1–5.1)
LYMPHOCYTES NFR BLD: 23.1 %
MCH RBC QN AUTO: 32.9 PG (ref 26–34)
MCHC RBC AUTO-ENTMCNC: 34.3 G/DL (ref 31–36)
MCV RBC AUTO: 96 FL (ref 80–100)
MONOCYTES # BLD: 1 K/UL (ref 0–1.3)
MONOCYTES NFR BLD: 15.2 %
NEUTROPHILS # BLD: 3.7 K/UL (ref 1.7–7.7)
NEUTROPHILS NFR BLD: 56.2 %
PLATELET # BLD AUTO: 224 K/UL (ref 135–450)
PMV BLD AUTO: 8.4 FL (ref 5–10.5)
POTASSIUM SERPL-SCNC: 4.5 MMOL/L (ref 3.5–5.1)
PROT SERPL-MCNC: 7.4 G/DL (ref 6.4–8.2)
RBC # BLD AUTO: 4.64 M/UL (ref 4.2–5.9)
SODIUM SERPL-SCNC: 141 MMOL/L (ref 136–145)
TRIGL SERPL-MCNC: 123 MG/DL (ref 0–150)
TSH SERPL DL<=0.005 MIU/L-ACNC: 1.22 UIU/ML (ref 0.27–4.2)
VLDLC SERPL CALC-MCNC: 25 MG/DL
WBC # BLD AUTO: 6.6 K/UL (ref 4–11)

## 2023-08-31 PROCEDURE — 80053 COMPREHEN METABOLIC PANEL: CPT

## 2023-08-31 PROCEDURE — 85025 COMPLETE CBC W/AUTO DIFF WBC: CPT

## 2023-08-31 PROCEDURE — 80061 LIPID PANEL: CPT

## 2023-08-31 PROCEDURE — 36415 COLL VENOUS BLD VENIPUNCTURE: CPT

## 2023-08-31 PROCEDURE — 84443 ASSAY THYROID STIM HORMONE: CPT

## 2023-08-31 PROCEDURE — 83036 HEMOGLOBIN GLYCOSYLATED A1C: CPT

## 2023-09-01 LAB
EST. AVERAGE GLUCOSE BLD GHB EST-MCNC: 119.8 MG/DL
HBA1C MFR BLD: 5.8 %

## 2023-09-14 ENCOUNTER — OFFICE VISIT (OUTPATIENT)
Dept: INTERNAL MEDICINE CLINIC | Age: 68
End: 2023-09-14
Payer: MEDICARE

## 2023-09-14 VITALS
DIASTOLIC BLOOD PRESSURE: 66 MMHG | HEART RATE: 94 BPM | TEMPERATURE: 97.3 F | WEIGHT: 201 LBS | BODY MASS INDEX: 31.48 KG/M2 | SYSTOLIC BLOOD PRESSURE: 126 MMHG | OXYGEN SATURATION: 97 %

## 2023-09-14 DIAGNOSIS — N52.9 ERECTILE DYSFUNCTION, UNSPECIFIED ERECTILE DYSFUNCTION TYPE: Primary | ICD-10-CM

## 2023-09-14 DIAGNOSIS — R73.03 PREDIABETES: ICD-10-CM

## 2023-09-14 DIAGNOSIS — N52.9 ERECTILE DYSFUNCTION, UNSPECIFIED ERECTILE DYSFUNCTION TYPE: ICD-10-CM

## 2023-09-14 DIAGNOSIS — E78.2 MIXED HYPERLIPIDEMIA: Primary | ICD-10-CM

## 2023-09-14 PROBLEM — H35.54 ADULT VITELLIFORM MACULAR DYSTROPHY: Status: ACTIVE | Noted: 2022-12-08

## 2023-09-14 PROBLEM — H25.9 AGE-RELATED CATARACT OF BOTH EYES: Status: ACTIVE | Noted: 2022-12-08

## 2023-09-14 PROCEDURE — 3017F COLORECTAL CA SCREEN DOC REV: CPT | Performed by: STUDENT IN AN ORGANIZED HEALTH CARE EDUCATION/TRAINING PROGRAM

## 2023-09-14 PROCEDURE — G8417 CALC BMI ABV UP PARAM F/U: HCPCS | Performed by: STUDENT IN AN ORGANIZED HEALTH CARE EDUCATION/TRAINING PROGRAM

## 2023-09-14 PROCEDURE — 1036F TOBACCO NON-USER: CPT | Performed by: STUDENT IN AN ORGANIZED HEALTH CARE EDUCATION/TRAINING PROGRAM

## 2023-09-14 PROCEDURE — G8427 DOCREV CUR MEDS BY ELIG CLIN: HCPCS | Performed by: STUDENT IN AN ORGANIZED HEALTH CARE EDUCATION/TRAINING PROGRAM

## 2023-09-14 PROCEDURE — 99214 OFFICE O/P EST MOD 30 MIN: CPT | Performed by: STUDENT IN AN ORGANIZED HEALTH CARE EDUCATION/TRAINING PROGRAM

## 2023-09-14 PROCEDURE — 1123F ACP DISCUSS/DSCN MKR DOCD: CPT | Performed by: STUDENT IN AN ORGANIZED HEALTH CARE EDUCATION/TRAINING PROGRAM

## 2023-09-14 RX ORDER — ATORVASTATIN CALCIUM 20 MG/1
20 TABLET, FILM COATED ORAL DAILY
Qty: 90 TABLET | Refills: 1 | Status: SHIPPED | OUTPATIENT
Start: 2023-09-14

## 2023-09-14 RX ORDER — SILDENAFIL 100 MG/1
TABLET, FILM COATED ORAL
Qty: 30 TABLET | Refills: 1 | Status: SHIPPED | OUTPATIENT
Start: 2023-09-14

## 2023-09-14 NOTE — PROGRESS NOTES
[facility]  2023    Stu Aviles (:  1955) is a 69 y.o. male, here for evaluation of the following medical concerns:    Chief Complaint   Patient presents with    Discuss Labs    Cholesterol Problem        ASSESSMENT/ PLAN  1. Mixed hyperlipidemia  -ASCVD score of 15.7%. Counseled on dietary modification. Start on atorvastatin. Return with labs. - atorvastatin (LIPITOR) 20 MG tablet; Take 1 tablet by mouth daily  Dispense: 90 tablet; Refill: 1  - Comprehensive Metabolic Panel; Future  - LIPID PANEL; Future    2. Prediabetes  -A1c 5.8. Counseled on diet weight loss and exercise    3. Erectile dysfunction, unspecified erectile dysfunction type  -Patient is currently on Cialis. He stated he has tried a compounded formulation of sildenafil and yohimbine and has worked well with him. Has noted some wearing effects on Cialis.   Discussed that we would call compounding pharmacy to confirm that drug is available and will get back to the patient         [address] [place] [address] Outpatient Visit on 2023   Component Date Value    WBC 2023 6.6     RBC 2023 4.64     Hemoglobin 2023 15.3     Hematocrit 2023 44.5     MCV 2023 96.0     MCH 2023 32.9     MCHC 2023 34.3     RDW 2023 13.2     Platelets  224     MPV 2023 8.4     Neutrophils % 2023 56.2     Lymphocytes % 2023 23.1     Monocytes % 2023 15.2     Eosinophils % 2023 4.5     Basophils % 2023 1.0     Neutrophils Absolute 2023 3.7     Lymphocytes Absolute 2023 1.5     Monocytes Absolute 2023 1.0     Eosinophils Absolute 2023 0.3     Basophils Absolute 2023 0.1     TSH Reflex FT4 2023 1.22     Hemoglobin A1C 2023 5.8     eAG 2023 119.8     Sodium 2023 141     Potassium 2023 4.5     Chloride 2023 103     CO2 2023 25     Anion Gap 2023 13     Glucose 2023 127 (H)     BUN

## 2023-09-15 ENCOUNTER — TELEPHONE (OUTPATIENT)
Dept: INTERNAL MEDICINE CLINIC | Age: 68
End: 2023-09-15

## 2023-09-15 NOTE — TELEPHONE ENCOUNTER
LVM for pt to call office back. If pt does it is ok to relay message from pcp. Pcp message is stated below    Please let patient know that compounding formulation is not available per the pharmacy. He should continue to take the Cialis instead.

## 2023-09-15 NOTE — TELEPHONE ENCOUNTER
The [facility] pharmacy phoned today regarding the prescription sent for sildenafil compounded with yohimbine. Yohimbine is not available. If the patient is able to get this, he can take it with the sildenafil. Prescription canceled at [facility]. Please send in new refill for sildenafil and anything else needed for this patient.

## 2023-09-27 ENCOUNTER — TELEPHONE (OUTPATIENT)
Dept: PULMONOLOGY | Age: 68
End: 2023-09-27

## 2023-09-27 DIAGNOSIS — G47.33 OSA (OBSTRUCTIVE SLEEP APNEA): Primary | ICD-10-CM

## 2023-10-03 ENCOUNTER — TELEPHONE (OUTPATIENT)
Dept: PULMONOLOGY | Age: 68
End: 2023-10-03

## 2023-10-03 NOTE — TELEPHONE ENCOUNTER
Andres Samuel from [facility] called and stated that Pt has medicare insurance and only scored at 3% and per medicare rule pt has to be ruled at 4%. Andres Samuel would like to know can we just change that or does Pt have to be retested.   You can reach Andres Samuel at [phone] opt 4 finely office

## 2023-10-04 NOTE — TELEPHONE ENCOUNTER
I reviewed the HST. It does appear it was scored with 3% criteria; however, almost all events will also be scored same if we use 4% criteria. The sleep techs will rescore the study with 4% rule and then I will review it and generate a new report. No additional testing will be needed. Please notify Komal Foreman in billing. Please look for update report in next 48 hours.

## 2023-10-12 PROBLEM — R06.83 SNORING: Status: ACTIVE | Noted: 2023-10-12

## 2023-12-20 PROBLEM — G47.33 MODERATE OBSTRUCTIVE SLEEP APNEA: Status: ACTIVE | Noted: 2023-12-20

## 2024-02-15 ENCOUNTER — TELEMEDICINE (OUTPATIENT)
Dept: INTERNAL MEDICINE CLINIC | Age: 69
End: 2024-02-15
Payer: MEDICARE

## 2024-02-15 DIAGNOSIS — Z00.00 INITIAL MEDICARE ANNUAL WELLNESS VISIT: Primary | ICD-10-CM

## 2024-02-15 PROCEDURE — G8484 FLU IMMUNIZE NO ADMIN: HCPCS | Performed by: STUDENT IN AN ORGANIZED HEALTH CARE EDUCATION/TRAINING PROGRAM

## 2024-02-15 PROCEDURE — 1123F ACP DISCUSS/DSCN MKR DOCD: CPT | Performed by: STUDENT IN AN ORGANIZED HEALTH CARE EDUCATION/TRAINING PROGRAM

## 2024-02-15 PROCEDURE — 3017F COLORECTAL CA SCREEN DOC REV: CPT | Performed by: STUDENT IN AN ORGANIZED HEALTH CARE EDUCATION/TRAINING PROGRAM

## 2024-02-15 PROCEDURE — G0438 PPPS, INITIAL VISIT: HCPCS | Performed by: STUDENT IN AN ORGANIZED HEALTH CARE EDUCATION/TRAINING PROGRAM

## 2024-02-15 SDOH — HEALTH STABILITY: PHYSICAL HEALTH: ON AVERAGE, HOW MANY MINUTES DO YOU ENGAGE IN EXERCISE AT THIS LEVEL?: 50 MIN

## 2024-02-15 SDOH — HEALTH STABILITY: PHYSICAL HEALTH: ON AVERAGE, HOW MANY DAYS PER WEEK DO YOU ENGAGE IN MODERATE TO STRENUOUS EXERCISE (LIKE A BRISK WALK)?: 3 DAYS

## 2024-02-15 ASSESSMENT — PATIENT HEALTH QUESTIONNAIRE - PHQ9
SUM OF ALL RESPONSES TO PHQ9 QUESTIONS 1 & 2: 0
2. FEELING DOWN, DEPRESSED OR HOPELESS: 0
SUM OF ALL RESPONSES TO PHQ QUESTIONS 1-9: 0
1. LITTLE INTEREST OR PLEASURE IN DOING THINGS: 0
2. FEELING DOWN, DEPRESSED OR HOPELESS: 0
1. LITTLE INTEREST OR PLEASURE IN DOING THINGS: 0
SUM OF ALL RESPONSES TO PHQ9 QUESTIONS 1 & 2: 0

## 2024-02-15 ASSESSMENT — LIFESTYLE VARIABLES
HOW OFTEN DO YOU HAVE SIX OR MORE DRINKS ON ONE OCCASION: 1
HOW OFTEN DO YOU HAVE A DRINK CONTAINING ALCOHOL: 2-3 TIMES A WEEK
HOW OFTEN DO YOU HAVE A DRINK CONTAINING ALCOHOL: 4
HOW MANY STANDARD DRINKS CONTAINING ALCOHOL DO YOU HAVE ON A TYPICAL DAY: 1
HOW MANY STANDARD DRINKS CONTAINING ALCOHOL DO YOU HAVE ON A TYPICAL DAY: 1 OR 2
HOW OFTEN DO YOU HAVE A DRINK CONTAINING ALCOHOL: 2-3 TIMES A WEEK
HOW MANY STANDARD DRINKS CONTAINING ALCOHOL DO YOU HAVE ON A TYPICAL DAY: 1 OR 2

## 2024-02-16 NOTE — PROGRESS NOTES
Medicare Annual Wellness Visit    Nitin Centeno is here for Medicare AWV    Assessment & Plan   Initial Medicare annual wellness visit  Recommendations for Preventive Services Due: see orders and patient instructions/AVS.  Recommended screening schedule for the next 5-10 years is provided to the patient in written form: see Patient Instructions/AVS.     Return for Medicare Annual Wellness Visit in 1 year.     Subjective       Patient's complete Health Risk Assessment and screening values have been reviewed and are found in Flowsheets. The following problems were reviewed today and where indicated follow up appointments were made and/or referrals ordered.    Positive Risk Factor Screenings with Interventions:                Activity, Diet, and Weight:  On average, how many days per week do you engage in moderate to strenuous exercise (like a brisk walk)?: 3 days  On average, how many minutes do you engage in exercise at this level?: 50 min    Do you eat balanced/healthy meals regularly?: Yes    There is no height or weight on file to calculate BMI. (!) Abnormal    Obesity Interventions:  Patient declines any further evaluation or treatment           Safety:  Do you have non-slip mats or non-slip surfaces or shower bars or grab bars in your shower or bathtub?: (!) No  Interventions:  PATIENT MISUNDERSTOOD AND MARKED THE QUESTION INCORRECTLY     Advanced Directives:  Do you have a Living Will?: (!) No    Intervention:  has an advanced directive - a copy HAS NOT been provided.                     PATIENT DECLINES PAPERWORK          Objective      Patient-Reported Vitals  Patient-Reported Weight: PATIENT HAS NO REPORTED VITALS             Allergies   Allergen Reactions    No Known Allergies      Prior to Visit Medications    Medication Sig Taking? Authorizing Provider   atorvastatin (LIPITOR) 20 MG tablet Take 1 tablet by mouth daily Yes Geoff Garcia MD   sildenafil (VIAGRA) 100 MG tablet To be used 30 minutes

## 2024-03-05 ENCOUNTER — TELEPHONE (OUTPATIENT)
Dept: INTERNAL MEDICINE CLINIC | Age: 69
End: 2024-03-05

## 2024-03-05 NOTE — TELEPHONE ENCOUNTER
----- Message from Alma Esteban Reyes sent at 3/5/2024  9:33 AM EST -----  Regarding: ECC Message to Provider  ECC Message to Provider    Relationship to Patient: Self     Additional Information patients wants to know if he will be needing a bloodtest before his office visit  --------------------------------------------------------------------------------------------------------------------------    Call Back Information: OK to leave message on voicemail  Preferred Call Back Number: Phone [phone] (home) [phone] (work) [phone]([employer])

## 2024-03-07 ENCOUNTER — HOSPITAL ENCOUNTER (OUTPATIENT)
Age: 69
Discharge: HOME | End: 2024-03-07
Payer: MEDICARE

## 2024-03-07 DIAGNOSIS — E78.2 MIXED HYPERLIPIDEMIA: ICD-10-CM

## 2024-03-07 LAB
ALBUMIN SERPL-MCNC: 4.5 G/DL (ref 3.4–5)
ALBUMIN/GLOB SERPL: 1.3 {RATIO} (ref 1.1–2.2)
ALP SERPL-CCNC: 124 U/L (ref 40–129)
ALT SERPL-CCNC: 27 U/L (ref 10–40)
ANION GAP SERPL CALCULATED.3IONS-SCNC: 14 MMOL/L (ref 3–16)
AST SERPL-CCNC: 30 U/L (ref 15–37)
BILIRUB SERPL-MCNC: 0.5 MG/DL (ref 0–1)
BUN SERPL-MCNC: 11 MG/DL (ref 7–20)
CALCIUM SERPL-MCNC: 9.6 MG/DL (ref 8.3–10.6)
CHLORIDE SERPL-SCNC: 98 MMOL/L (ref 99–110)
CHOLEST SERPL-MCNC: 211 MG/DL (ref 0–199)
CO2 SERPL-SCNC: 26 MMOL/L (ref 21–32)
CREAT SERPL-MCNC: 0.7 MG/DL (ref 0.8–1.3)
GFR SERPLBLD CREATININE-BSD FMLA CKD-EPI: >60 ML/MIN/{1.73_M2}
GLUCOSE SERPL-MCNC: 87 MG/DL (ref 70–99)
HDLC SERPL-MCNC: 67 MG/DL (ref 40–60)
LDLC SERPL CALC-MCNC: 115 MG/DL
POTASSIUM SERPL-SCNC: 3.9 MMOL/L (ref 3.5–5.1)
PROT SERPL-MCNC: 8 G/DL (ref 6.4–8.2)
SODIUM SERPL-SCNC: 138 MMOL/L (ref 136–145)
TRIGL SERPL-MCNC: 146 MG/DL (ref 0–150)
VLDLC SERPL CALC-MCNC: 29 MG/DL

## 2024-03-07 PROCEDURE — 80061 LIPID PANEL: CPT

## 2024-03-07 PROCEDURE — 36415 COLL VENOUS BLD VENIPUNCTURE: CPT

## 2024-03-07 PROCEDURE — 80053 COMPREHEN METABOLIC PANEL: CPT

## 2024-03-12 ENCOUNTER — TELEPHONE (OUTPATIENT)
Dept: PULMONOLOGY | Age: 69
End: 2024-03-12

## 2024-03-12 ENCOUNTER — OFFICE VISIT (OUTPATIENT)
Dept: INTERNAL MEDICINE CLINIC | Age: 69
End: 2024-03-12
Payer: MEDICARE

## 2024-03-12 VITALS
HEART RATE: 89 BPM | OXYGEN SATURATION: 97 % | TEMPERATURE: 98 F | WEIGHT: 202 LBS | SYSTOLIC BLOOD PRESSURE: 108 MMHG | BODY MASS INDEX: 31.64 KG/M2 | DIASTOLIC BLOOD PRESSURE: 68 MMHG

## 2024-03-12 DIAGNOSIS — N52.9 ERECTILE DYSFUNCTION, UNSPECIFIED ERECTILE DYSFUNCTION TYPE: ICD-10-CM

## 2024-03-12 DIAGNOSIS — G47.33 MODERATE OBSTRUCTIVE SLEEP APNEA: ICD-10-CM

## 2024-03-12 DIAGNOSIS — R73.03 PREDIABETES: ICD-10-CM

## 2024-03-12 DIAGNOSIS — E78.2 MIXED HYPERLIPIDEMIA: Primary | ICD-10-CM

## 2024-03-12 DIAGNOSIS — G47.33 OSA (OBSTRUCTIVE SLEEP APNEA): Primary | ICD-10-CM

## 2024-03-12 DIAGNOSIS — E66.9 CLASS 1 OBESITY WITH SERIOUS COMORBIDITY AND BODY MASS INDEX (BMI) OF 31.0 TO 31.9 IN ADULT, UNSPECIFIED OBESITY TYPE: ICD-10-CM

## 2024-03-12 PROCEDURE — 1123F ACP DISCUSS/DSCN MKR DOCD: CPT | Performed by: STUDENT IN AN ORGANIZED HEALTH CARE EDUCATION/TRAINING PROGRAM

## 2024-03-12 PROCEDURE — 99214 OFFICE O/P EST MOD 30 MIN: CPT | Performed by: STUDENT IN AN ORGANIZED HEALTH CARE EDUCATION/TRAINING PROGRAM

## 2024-03-12 PROCEDURE — 1036F TOBACCO NON-USER: CPT | Performed by: STUDENT IN AN ORGANIZED HEALTH CARE EDUCATION/TRAINING PROGRAM

## 2024-03-12 PROCEDURE — G8484 FLU IMMUNIZE NO ADMIN: HCPCS | Performed by: STUDENT IN AN ORGANIZED HEALTH CARE EDUCATION/TRAINING PROGRAM

## 2024-03-12 PROCEDURE — 3017F COLORECTAL CA SCREEN DOC REV: CPT | Performed by: STUDENT IN AN ORGANIZED HEALTH CARE EDUCATION/TRAINING PROGRAM

## 2024-03-12 PROCEDURE — G8427 DOCREV CUR MEDS BY ELIG CLIN: HCPCS | Performed by: STUDENT IN AN ORGANIZED HEALTH CARE EDUCATION/TRAINING PROGRAM

## 2024-03-12 PROCEDURE — G8417 CALC BMI ABV UP PARAM F/U: HCPCS | Performed by: STUDENT IN AN ORGANIZED HEALTH CARE EDUCATION/TRAINING PROGRAM

## 2024-03-12 NOTE — TELEPHONE ENCOUNTER
Pt called and stated that he is having issues w/ his mask cutting into his nose. Pt is requesting an order for a different mask. Pt requests a f/u call. Please advise.

## 2024-03-12 NOTE — PROGRESS NOTES
bilaterally.    Geoff Garcia MD    This dictation was generated by voice recognition computer software.  Although all attempts are made to edit the dictation for accuracy, there may be errors in the transcription that are not intended.

## 2024-03-13 NOTE — TELEPHONE ENCOUNTER
Patient should get mask fitting at DME with [facility].  Orders placed in epic.  Please make patient aware, he may need to call DME to schedule appointment

## 2024-03-16 DIAGNOSIS — E78.2 MIXED HYPERLIPIDEMIA: ICD-10-CM

## 2024-03-18 RX ORDER — ATORVASTATIN CALCIUM 20 MG/1
20 TABLET, FILM COATED ORAL DAILY
Qty: 90 TABLET | Refills: 1 | Status: SHIPPED | OUTPATIENT
Start: 2024-03-18

## 2024-03-18 NOTE — TELEPHONE ENCOUNTER
Refill request for ATORVASTATIN medication.     Name of Pharmacy- [facility]      Last visit - 3-12-24     Pending visit - 9-12-24    Last refill -9-14-23      Medication Contract signed -   Last Oarrs ran-         Additional Comments

## 2024-03-19 NOTE — TELEPHONE ENCOUNTER
Left message for patient to call [facility] that orders were sent.   Call office if he needs anything else from us.

## 2024-09-20 DIAGNOSIS — E78.2 MIXED HYPERLIPIDEMIA: ICD-10-CM

## 2024-09-20 RX ORDER — ATORVASTATIN CALCIUM 20 MG/1
20 TABLET, FILM COATED ORAL DAILY
Qty: 90 TABLET | Refills: 1 | Status: SHIPPED | OUTPATIENT
Start: 2024-09-20

## 2024-09-26 ENCOUNTER — HOSPITAL ENCOUNTER (OUTPATIENT)
Age: 69
Discharge: HOME | End: 2024-09-26
Payer: MEDICARE

## 2024-09-26 DIAGNOSIS — R73.03 PREDIABETES: ICD-10-CM

## 2024-09-26 DIAGNOSIS — E78.2 MIXED HYPERLIPIDEMIA: ICD-10-CM

## 2024-09-26 LAB
ALBUMIN SERPL-MCNC: 4.2 G/DL (ref 3.4–5)
ALBUMIN/GLOB SERPL: 1.4 {RATIO} (ref 1.1–2.2)
ALP SERPL-CCNC: 104 U/L (ref 40–129)
ALT SERPL-CCNC: 19 U/L (ref 10–40)
ANION GAP SERPL CALCULATED.3IONS-SCNC: 12 MMOL/L (ref 3–16)
AST SERPL-CCNC: 28 U/L (ref 15–37)
BILIRUB SERPL-MCNC: 0.7 MG/DL (ref 0–1)
BUN SERPL-MCNC: 11 MG/DL (ref 7–20)
CALCIUM SERPL-MCNC: 9.4 MG/DL (ref 8.3–10.6)
CHLORIDE SERPL-SCNC: 99 MMOL/L (ref 99–110)
CHOLEST SERPL-MCNC: 235 MG/DL (ref 0–199)
CO2 SERPL-SCNC: 24 MMOL/L (ref 21–32)
CREAT SERPL-MCNC: 0.8 MG/DL (ref 0.8–1.3)
GFR SERPLBLD CREATININE-BSD FMLA CKD-EPI: >90 ML/MIN/{1.73_M2}
GLUCOSE SERPL-MCNC: 111 MG/DL (ref 70–99)
HDLC SERPL-MCNC: 69 MG/DL (ref 40–60)
LDLC SERPL CALC-MCNC: 139 MG/DL
POTASSIUM SERPL-SCNC: 4.1 MMOL/L (ref 3.5–5.1)
PROT SERPL-MCNC: 7.1 G/DL (ref 6.4–8.2)
SODIUM SERPL-SCNC: 135 MMOL/L (ref 136–145)
TRIGL SERPL-MCNC: 137 MG/DL (ref 0–150)
VLDLC SERPL CALC-MCNC: 27 MG/DL

## 2024-09-26 PROCEDURE — 36415 COLL VENOUS BLD VENIPUNCTURE: CPT

## 2024-09-26 PROCEDURE — 80053 COMPREHEN METABOLIC PANEL: CPT

## 2024-09-26 PROCEDURE — 80061 LIPID PANEL: CPT

## 2024-09-26 PROCEDURE — 83036 HEMOGLOBIN GLYCOSYLATED A1C: CPT

## 2024-09-27 LAB
EST. AVERAGE GLUCOSE BLD GHB EST-MCNC: 105.4 MG/DL
HBA1C MFR BLD: 5.3 %

## 2024-09-30 SDOH — ECONOMIC STABILITY: FOOD INSECURITY: WITHIN THE PAST 12 MONTHS, YOU WORRIED THAT YOUR FOOD WOULD RUN OUT BEFORE YOU GOT MONEY TO BUY MORE.: NEVER TRUE

## 2024-09-30 SDOH — ECONOMIC STABILITY: FOOD INSECURITY: WITHIN THE PAST 12 MONTHS, THE FOOD YOU BOUGHT JUST DIDN'T LAST AND YOU DIDN'T HAVE MONEY TO GET MORE.: NEVER TRUE

## 2024-09-30 SDOH — ECONOMIC STABILITY: TRANSPORTATION INSECURITY
IN THE PAST 12 MONTHS, HAS LACK OF TRANSPORTATION KEPT YOU FROM MEETINGS, WORK, OR FROM GETTING THINGS NEEDED FOR DAILY LIVING?: NO

## 2024-09-30 SDOH — ECONOMIC STABILITY: INCOME INSECURITY: HOW HARD IS IT FOR YOU TO PAY FOR THE VERY BASICS LIKE FOOD, HOUSING, MEDICAL CARE, AND HEATING?: NOT HARD AT ALL

## 2024-10-01 ENCOUNTER — OFFICE VISIT (OUTPATIENT)
Dept: INTERNAL MEDICINE CLINIC | Age: 69
End: 2024-10-01
Payer: MEDICARE

## 2024-10-01 VITALS
TEMPERATURE: 97.9 F | BODY MASS INDEX: 31.71 KG/M2 | DIASTOLIC BLOOD PRESSURE: 80 MMHG | SYSTOLIC BLOOD PRESSURE: 130 MMHG | WEIGHT: 202 LBS | HEART RATE: 77 BPM | HEIGHT: 67 IN | OXYGEN SATURATION: 96 %

## 2024-10-01 DIAGNOSIS — Z23 NEED FOR PNEUMOCOCCAL 20-VALENT CONJUGATE VACCINATION: ICD-10-CM

## 2024-10-01 DIAGNOSIS — G89.29 CHRONIC PAIN OF LEFT KNEE: ICD-10-CM

## 2024-10-01 DIAGNOSIS — R73.03 PREDIABETES: Primary | ICD-10-CM

## 2024-10-01 DIAGNOSIS — Z23 NEED FOR INFLUENZA VACCINATION: ICD-10-CM

## 2024-10-01 DIAGNOSIS — G47.33 MODERATE OBSTRUCTIVE SLEEP APNEA: ICD-10-CM

## 2024-10-01 DIAGNOSIS — M25.562 CHRONIC PAIN OF LEFT KNEE: ICD-10-CM

## 2024-10-01 DIAGNOSIS — E78.2 MIXED HYPERLIPIDEMIA: ICD-10-CM

## 2024-10-01 DIAGNOSIS — N52.9 ERECTILE DYSFUNCTION, UNSPECIFIED ERECTILE DYSFUNCTION TYPE: ICD-10-CM

## 2024-10-01 PROCEDURE — 90677 PCV20 VACCINE IM: CPT | Performed by: STUDENT IN AN ORGANIZED HEALTH CARE EDUCATION/TRAINING PROGRAM

## 2024-10-01 PROCEDURE — G0008 ADMIN INFLUENZA VIRUS VAC: HCPCS | Performed by: STUDENT IN AN ORGANIZED HEALTH CARE EDUCATION/TRAINING PROGRAM

## 2024-10-01 PROCEDURE — G8427 DOCREV CUR MEDS BY ELIG CLIN: HCPCS | Performed by: STUDENT IN AN ORGANIZED HEALTH CARE EDUCATION/TRAINING PROGRAM

## 2024-10-01 PROCEDURE — G8417 CALC BMI ABV UP PARAM F/U: HCPCS | Performed by: STUDENT IN AN ORGANIZED HEALTH CARE EDUCATION/TRAINING PROGRAM

## 2024-10-01 PROCEDURE — 90653 IIV ADJUVANT VACCINE IM: CPT | Performed by: STUDENT IN AN ORGANIZED HEALTH CARE EDUCATION/TRAINING PROGRAM

## 2024-10-01 PROCEDURE — G0009 ADMIN PNEUMOCOCCAL VACCINE: HCPCS | Performed by: STUDENT IN AN ORGANIZED HEALTH CARE EDUCATION/TRAINING PROGRAM

## 2024-10-01 PROCEDURE — 3017F COLORECTAL CA SCREEN DOC REV: CPT | Performed by: STUDENT IN AN ORGANIZED HEALTH CARE EDUCATION/TRAINING PROGRAM

## 2024-10-01 PROCEDURE — 1123F ACP DISCUSS/DSCN MKR DOCD: CPT | Performed by: STUDENT IN AN ORGANIZED HEALTH CARE EDUCATION/TRAINING PROGRAM

## 2024-10-01 PROCEDURE — G8482 FLU IMMUNIZE ORDER/ADMIN: HCPCS | Performed by: STUDENT IN AN ORGANIZED HEALTH CARE EDUCATION/TRAINING PROGRAM

## 2024-10-01 PROCEDURE — 99214 OFFICE O/P EST MOD 30 MIN: CPT | Performed by: STUDENT IN AN ORGANIZED HEALTH CARE EDUCATION/TRAINING PROGRAM

## 2024-10-01 PROCEDURE — 1036F TOBACCO NON-USER: CPT | Performed by: STUDENT IN AN ORGANIZED HEALTH CARE EDUCATION/TRAINING PROGRAM

## 2024-10-01 RX ORDER — ATORVASTATIN CALCIUM 40 MG/1
40 TABLET, FILM COATED ORAL DAILY
Qty: 90 TABLET | Refills: 1 | Status: SHIPPED | OUTPATIENT
Start: 2024-10-01 | End: 2025-03-30

## 2024-10-01 NOTE — PROGRESS NOTES
[facility]  10/1/2024    Nitin Centeno (:  1955) is a 69 y.o. male, here for evaluation of the following medical concerns:    Chief Complaint   Patient presents with    Hyperlipidemia        ASSESSMENT/ PLAN  1. Mixed hyperlipidemia  LDL suboptimal.  Patient does have some concerns about statins which I discussed with him today.  Discussed risk-benefit .  He notes he has been taking the 20 mg dose.  Increase to 40 mg daily.  Advised dietary modification, weight loss, exercise  - atorvastatin (LIPITOR) 40 MG tablet; Take 1 tablet by mouth daily  Dispense: 90 tablet; Refill: 1  - Comprehensive Metabolic Panel; Future  - LIPID PANEL; Future    2. Prediabetes  A1c has improved    3. Erectile dysfunction, unspecified erectile dysfunction type  Continue on Cialis as needed    4. Moderate obstructive sleep apnea  Continue CPAP use.  Following up with pulmonology    5. Chronic pain of left knee  Does have crepitus on exam.  Likely osteoarthritis.  Pain has been resolving.  Discussed option to get an x-ray and PT.  Wants to hold off for now    6. Need for pneumococcal 20-valent conjugate vaccination  - Pneumococcal, PCV20, PREVNAR 20, (age 6w+), IM, PF    7. Need for influenza vaccination  - Influenza, FLUAD Trivalent, (age 65 y+), IM, Preservative Free, 0.5mL     Follow-up in 6 months with labs    Lab Review   Hospital Outpatient Visit on 2024   Component Date Value    Hemoglobin A1C 2024 5.3     Estimated Avg Glucose 2024 105.4     Cholesterol, Total 2024 235 (H)     Triglycerides 2024 137     HDL 2024 69 (H)     LDL Cholesterol 2024 139 (H)     VLDL Cholesterol Calcula* 2024 27     Sodium 2024 135 (L)     Potassium 2024 4.1     Chloride 2024 99     CO2 2024 24     Anion Gap 2024 12     Glucose 2024 111 (H)     BUN 2024 11     Creatinine 2024 0.8     Est, Glom Filt Rate 2024 >90     Calcium 2024 9.4

## 2024-12-23 DIAGNOSIS — N52.9 ERECTILE DYSFUNCTION, UNSPECIFIED ERECTILE DYSFUNCTION TYPE: ICD-10-CM

## 2024-12-23 RX ORDER — SILDENAFIL 100 MG/1
TABLET, FILM COATED ORAL
Qty: 30 TABLET | Refills: 0 | Status: SHIPPED | OUTPATIENT
Start: 2024-12-23

## 2024-12-23 RX ORDER — TADALAFIL 10 MG/1
10 TABLET ORAL PRN
Qty: 30 TABLET | Refills: 3 | OUTPATIENT
Start: 2024-12-23

## 2024-12-23 NOTE — TELEPHONE ENCOUNTER
LAST REFILL 05/18/2023  AMOUNT 30     3 REFILLS  LAST VISIT 10/1/2024  NEXT VISIT   Future Appointments   Date Time Provider Department Center   1/16/2025 11:40 AM Angelique Snell APRN - CNP [facility] SLEEP [facility]   4/1/2025 11:00 AM Geoff Garcia MD [facility] DEP

## 2024-12-23 NOTE — TELEPHONE ENCOUNTER
Refill request for   sildenafil (VIAGRA) 100 MG tablet      Geoff Garcia MD      Edit  Cancel Reorder    medication.     [facility]      Last visit - [id number]     Pending visit - [id number]    Last refill -[id number]      Medication Contract signed -  Last Oarrs ran-         Additional Comments

## 2025-01-13 ENCOUNTER — TELEPHONE (OUTPATIENT)
Dept: PULMONOLOGY | Age: 70
End: 2025-01-13

## 2025-01-13 NOTE — TELEPHONE ENCOUNTER
Patient LVM on office machine to call to r/s appt.     LVM for patient to call us back to assist in r/s of his appt

## 2025-01-23 ENCOUNTER — OFFICE VISIT (OUTPATIENT)
Dept: SLEEP MEDICINE | Age: 70
End: 2025-01-23
Payer: MEDICARE

## 2025-01-23 ENCOUNTER — TELEPHONE (OUTPATIENT)
Dept: SLEEP MEDICINE | Age: 70
End: 2025-01-23

## 2025-01-23 VITALS
HEART RATE: 91 BPM | DIASTOLIC BLOOD PRESSURE: 82 MMHG | RESPIRATION RATE: 18 BRPM | WEIGHT: 202.6 LBS | OXYGEN SATURATION: 97 % | HEIGHT: 67 IN | BODY MASS INDEX: 31.8 KG/M2 | SYSTOLIC BLOOD PRESSURE: 124 MMHG

## 2025-01-23 DIAGNOSIS — G47.33 MODERATE OBSTRUCTIVE SLEEP APNEA: Primary | ICD-10-CM

## 2025-01-23 DIAGNOSIS — Z71.89 CPAP USE COUNSELING: ICD-10-CM

## 2025-01-23 DIAGNOSIS — E66.9 OBESITY (BMI 30-39.9): ICD-10-CM

## 2025-01-23 PROCEDURE — 99214 OFFICE O/P EST MOD 30 MIN: CPT | Performed by: NURSE PRACTITIONER

## 2025-01-23 PROCEDURE — 1160F RVW MEDS BY RX/DR IN RCRD: CPT | Performed by: NURSE PRACTITIONER

## 2025-01-23 PROCEDURE — G8427 DOCREV CUR MEDS BY ELIG CLIN: HCPCS | Performed by: NURSE PRACTITIONER

## 2025-01-23 PROCEDURE — G8417 CALC BMI ABV UP PARAM F/U: HCPCS | Performed by: NURSE PRACTITIONER

## 2025-01-23 PROCEDURE — 1123F ACP DISCUSS/DSCN MKR DOCD: CPT | Performed by: NURSE PRACTITIONER

## 2025-01-23 PROCEDURE — 1036F TOBACCO NON-USER: CPT | Performed by: NURSE PRACTITIONER

## 2025-01-23 PROCEDURE — 3017F COLORECTAL CA SCREEN DOC REV: CPT | Performed by: NURSE PRACTITIONER

## 2025-01-23 PROCEDURE — 1159F MED LIST DOCD IN RCRD: CPT | Performed by: NURSE PRACTITIONER

## 2025-01-23 ASSESSMENT — SLEEP AND FATIGUE QUESTIONNAIRES
HOW LIKELY ARE YOU TO NOD OFF OR FALL ASLEEP WHILE SITTING QUIETLY AFTER LUNCH WITHOUT ALCOHOL: WOULD NEVER DOZE
HOW LIKELY ARE YOU TO NOD OFF OR FALL ASLEEP WHILE LYING DOWN TO REST IN THE AFTERNOON WHEN CIRCUMSTANCES PERMIT: WOULD NEVER DOZE
HOW LIKELY ARE YOU TO NOD OFF OR FALL ASLEEP IN A CAR, WHILE STOPPED FOR A FEW MINUTES IN TRAFFIC: WOULD NEVER DOZE
HOW LIKELY ARE YOU TO NOD OFF OR FALL ASLEEP WHILE WATCHING TV: WOULD NEVER DOZE
HOW LIKELY ARE YOU TO NOD OFF OR FALL ASLEEP WHILE SITTING INACTIVE IN A PUBLIC PLACE: WOULD NEVER DOZE
HOW LIKELY ARE YOU TO NOD OFF OR FALL ASLEEP WHILE SITTING AND READING: WOULD NEVER DOZE
HOW LIKELY ARE YOU TO NOD OFF OR FALL ASLEEP WHEN YOU ARE A PASSENGER IN A CAR FOR AN HOUR WITHOUT A BREAK: WOULD NEVER DOZE
ESS TOTAL SCORE: 0

## 2025-01-23 NOTE — PROGRESS NOTES
Patient ID: Nitin Centeno is a 69 y.o. male who is being seen today for   Chief Complaint   Patient presents with    Sleep Apnea     1 year follow up     Referring: Dr. Geoff Garcia    HPI:     Nitin Centeno is a 69 y.o. male in office for ANGELY follow up.  States over the past few weeks has been making a snorting noise/mask leak?- happens more in the morning, states it is reported by his wife but he knows he is doing it as well, states he is awake when it happens.  Patient is using CPAP   9 hrs/night. Using humidifier. No snoring on CPAP. The pressure is well tolerated. The mask is comfortable- nasal mask N30i. No mask leak. No significant daytime sleepiness. No nodding off when driving. No dry nose or throat. No fatigue. Bedtime is 10 pm and rise time is 7 am. Sleep onset is few minutes. Wakes up 1-2 times at night total. 1-2 nocturia. It takes few minutes to fall back a sleep. No naps during the day. No headache in am. No weight gain. 1 caffienated beverages during the day. 1-2 glasses wine most. ESS is 0          Previous HPI 12/20/23  Nitin Centeno is a 69 y.o. male in office for ANGELY follow up. HST was  reviewed by me and noted below. It showed moderate ANGELY and patient started auto CPAP. Results were dicussed with patient and multiple good questions were answered.  He states he is doing well with CPAP.    Patient is using CPAP 8 hrs/night. Using humidifier. No snoring on CPAP. The pressure is well tolerated. The mask is comfortable-nasal mask. No mask leak. No significant daytime sleepiness. No nodding off when driving. No dry nose or throat. No fatigue. Bedtime is  pm and rise time is 930 am. Sleep onset is few minutes. Wakes up 1 times at night total. 1 nocturia. It takes few minutes to fall back a sleep. No naps during the day. No headache in am. No weight gain. 1 caffienated beverages during the day. 1 glass of wine a day. ESS is 2        Initial HPI 8/21/23  Nitin Centeno is a 69

## 2025-01-23 NOTE — TELEPHONE ENCOUNTER
Pt saw KW today 1/23/25. KW changed pressures o machine today, orders were faxed.   Need CR in 1 month.

## 2025-02-27 NOTE — TELEPHONE ENCOUNTER
CPAP download report from 1/28/2025 - 2/26/2025 on auto CPAP 11-17 cm H2O reviewed.  Compliance is 97%.  AHI has improved and is good 4.4

## 2025-03-07 ENCOUNTER — OFFICE VISIT (OUTPATIENT)
Age: 70
End: 2025-03-07

## 2025-03-07 VITALS
HEIGHT: 67 IN | TEMPERATURE: 97.7 F | WEIGHT: 202 LBS | OXYGEN SATURATION: 97 % | HEART RATE: 78 BPM | DIASTOLIC BLOOD PRESSURE: 76 MMHG | SYSTOLIC BLOOD PRESSURE: 136 MMHG | BODY MASS INDEX: 31.71 KG/M2

## 2025-03-07 DIAGNOSIS — S01.81XA LACERATION OF FOREHEAD, INITIAL ENCOUNTER: Primary | ICD-10-CM

## 2025-03-07 DIAGNOSIS — R03.0 ELEVATED BLOOD PRESSURE READING IN OFFICE WITHOUT DIAGNOSIS OF HYPERTENSION: ICD-10-CM

## 2025-03-07 RX ORDER — CEPHALEXIN 500 MG/1
500 CAPSULE ORAL 3 TIMES DAILY
Qty: 21 CAPSULE | Refills: 0 | Status: SHIPPED | OUTPATIENT
Start: 2025-03-07 | End: 2025-03-14

## 2025-03-07 NOTE — PATIENT INSTRUCTIONS
Keep wound clean and dry-   Ibuprofen/tylenol (if no contraindications) prn pain    leave open to air at home- can cover if in a dirty environment  Return  anytime if increase in local pain, increase redness or red streak forming or if increase swelling or drainage noted  Take antibiotic if prescribed  Return 10-14 days if not healed

## 2025-03-07 NOTE — PROGRESS NOTES
shortness of breath.    Cardiovascular:  Negative for chest pain.   Gastrointestinal:  Negative for nausea and vomiting.   Musculoskeletal:  Negative for neck pain.   Skin:  Positive for wound.   Neurological:  Positive for headaches. Negative for dizziness and light-headedness.   Psychiatric/Behavioral:  Negative for confusion and decreased concentration.        Physical Exam    Physical  Vitals signs: reviewed  Constitutional:  appearance: well nourished ..  does not appear acutely ill  ..no distress   Eyes:                 Pupil: equal-round-reactive to light, no photophobia, EOMI            Cornea: clear            Sclera: clear, non injected, non icteric    Ears: Right canal clear / TM normal           Left ear canal clear / TM normal  Nose/Sinus:  no nasal congestion/no drainage   Mouth:                 Mucous membranes moist               Oropharynx:  clear, no erythema, no exudates, voice normal  Neck:    supple, non tender,               No cervical lymph nodes   Pulmonary/Lungs:  effort normal, no stridor                                 Auscultation: good air movement / breath sounds normal  Cardio-vascular:  normal rate and rhythm                              Heart sounds normal-no murmur- no rub   Abdomen:    soft . Non tender,    Muscular skeleton:  motor strength normal / muscle tone normal                                  Extremities/joints: no tenderness,                                    Lower extremities: no calf tenderness  Neurological:  no focal deficit  Skin: no rash   Psychiatric:   behavior appropriate--no confusion    An electronic signature was used to authenticate this note.    --Iam Alvarez MD

## 2025-03-17 ENCOUNTER — TELEPHONE (OUTPATIENT)
Dept: INTERNAL MEDICINE CLINIC | Age: 70
End: 2025-03-17

## 2025-03-25 DIAGNOSIS — E78.2 MIXED HYPERLIPIDEMIA: ICD-10-CM

## 2025-03-25 LAB
ALBUMIN SERPL-MCNC: 4.3 G/DL (ref 3.4–5)
ALBUMIN/GLOB SERPL: 1.7 {RATIO} (ref 1.1–2.2)
ALP SERPL-CCNC: 118 U/L (ref 40–129)
ALT SERPL-CCNC: 40 U/L (ref 10–40)
ANION GAP SERPL CALCULATED.3IONS-SCNC: 11 MMOL/L (ref 3–16)
AST SERPL-CCNC: 42 U/L (ref 15–37)
BILIRUB SERPL-MCNC: 0.3 MG/DL (ref 0–1)
BUN SERPL-MCNC: 9 MG/DL (ref 7–20)
CALCIUM SERPL-MCNC: 9.1 MG/DL (ref 8.3–10.6)
CHLORIDE SERPL-SCNC: 102 MMOL/L (ref 99–110)
CHOLEST SERPL-MCNC: 198 MG/DL (ref 0–199)
CO2 SERPL-SCNC: 26 MMOL/L (ref 21–32)
CREAT SERPL-MCNC: 0.8 MG/DL (ref 0.8–1.3)
GFR SERPLBLD CREATININE-BSD FMLA CKD-EPI: >90 ML/MIN/{1.73_M2}
GLUCOSE SERPL-MCNC: 106 MG/DL (ref 70–99)
HDLC SERPL-MCNC: 71 MG/DL (ref 40–60)
LDLC SERPL CALC-MCNC: 104 MG/DL
POTASSIUM SERPL-SCNC: 4.3 MMOL/L (ref 3.5–5.1)
PROT SERPL-MCNC: 6.9 G/DL (ref 6.4–8.2)
SODIUM SERPL-SCNC: 139 MMOL/L (ref 136–145)
TRIGL SERPL-MCNC: 117 MG/DL (ref 0–150)
VLDLC SERPL CALC-MCNC: 23 MG/DL

## 2025-04-07 NOTE — TELEPHONE ENCOUNTER
Patient states he is feeling very tired and would like his testosterone checked.  He states he has 2 relatives who are NP's and they suggested he have his testosterone checked.      Patient would like a call when the order is in the system.     [phone] (home) [phone] (work)

## 2025-04-08 ENCOUNTER — TELEPHONE (OUTPATIENT)
Dept: INTERNAL MEDICINE CLINIC | Age: 70
End: 2025-04-08

## 2025-04-23 SDOH — ECONOMIC STABILITY: FOOD INSECURITY: WITHIN THE PAST 12 MONTHS, THE FOOD YOU BOUGHT JUST DIDN'T LAST AND YOU DIDN'T HAVE MONEY TO GET MORE.: NEVER TRUE

## 2025-04-23 SDOH — ECONOMIC STABILITY: FOOD INSECURITY: WITHIN THE PAST 12 MONTHS, YOU WORRIED THAT YOUR FOOD WOULD RUN OUT BEFORE YOU GOT MONEY TO BUY MORE.: NEVER TRUE

## 2025-04-23 SDOH — ECONOMIC STABILITY: INCOME INSECURITY: IN THE LAST 12 MONTHS, WAS THERE A TIME WHEN YOU WERE NOT ABLE TO PAY THE MORTGAGE OR RENT ON TIME?: NO

## 2025-04-23 SDOH — ECONOMIC STABILITY: TRANSPORTATION INSECURITY
IN THE PAST 12 MONTHS, HAS THE LACK OF TRANSPORTATION KEPT YOU FROM MEDICAL APPOINTMENTS OR FROM GETTING MEDICATIONS?: NO

## 2025-04-23 ASSESSMENT — PATIENT HEALTH QUESTIONNAIRE - PHQ9
1. LITTLE INTEREST OR PLEASURE IN DOING THINGS: NOT AT ALL
SUM OF ALL RESPONSES TO PHQ QUESTIONS 1-9: 0
2. FEELING DOWN, DEPRESSED OR HOPELESS: NOT AT ALL
1. LITTLE INTEREST OR PLEASURE IN DOING THINGS: NOT AT ALL
SUM OF ALL RESPONSES TO PHQ QUESTIONS 1-9: 0
SUM OF ALL RESPONSES TO PHQ9 QUESTIONS 1 & 2: 0
SUM OF ALL RESPONSES TO PHQ QUESTIONS 1-9: 0
2. FEELING DOWN, DEPRESSED OR HOPELESS: NOT AT ALL
SUM OF ALL RESPONSES TO PHQ QUESTIONS 1-9: 0

## 2025-04-24 ENCOUNTER — OFFICE VISIT (OUTPATIENT)
Dept: INTERNAL MEDICINE CLINIC | Age: 70
End: 2025-04-24

## 2025-04-24 VITALS
TEMPERATURE: 97.3 F | SYSTOLIC BLOOD PRESSURE: 120 MMHG | WEIGHT: 203 LBS | BODY MASS INDEX: 31.86 KG/M2 | DIASTOLIC BLOOD PRESSURE: 80 MMHG | OXYGEN SATURATION: 96 % | HEIGHT: 67 IN | HEART RATE: 80 BPM

## 2025-04-24 DIAGNOSIS — R73.03 PREDIABETES: ICD-10-CM

## 2025-04-24 DIAGNOSIS — E78.2 MIXED HYPERLIPIDEMIA: ICD-10-CM

## 2025-04-24 DIAGNOSIS — G47.33 MODERATE OBSTRUCTIVE SLEEP APNEA: Primary | ICD-10-CM

## 2025-04-24 DIAGNOSIS — Z13.0 SCREENING FOR DEFICIENCY ANEMIA: ICD-10-CM

## 2025-04-24 RX ORDER — ATORVASTATIN CALCIUM 40 MG/1
40 TABLET, FILM COATED ORAL DAILY
Qty: 90 TABLET | Refills: 1 | Status: SHIPPED | OUTPATIENT
Start: 2025-04-24 | End: 2025-10-21

## 2025-04-24 NOTE — PROGRESS NOTES
accompanies him.    Blood pressure was noted to be elevated during today's visit. Although he owns a home blood pressure monitor, regular use has not been established as he has not previously encountered issues with his blood pressure.    Sleep apnea is managed with CPAP therapy, which he continues to use despite disliking the machine. He reports improved energy levels since starting CPAP therapy, although occasional fatigue is noted due to sleep disturbances caused by the machine.    Hypercholesterolemia is being treated with atorvastatin, which was increased to 40 mg at the last visit. No adverse effects, such as muscle aches, have been reported. Cholesterol levels have shown improvement, with LDL decreasing from 139 to 104. He has been taking red yeast rice inconsistently.    The left knee, which previously caused discomfort, is reported to be stable and not currently problematic.    A recent fall resulted in hitting his forehead on the side of a curio cabinet, causing a cut. He mentions having slipped on doors a few times but reports no fractures or significant injuries.    A colonoscopy was performed on 10/01/2024, and he was moved to the 5-year follow-up program. No urinary issues are reported.    Review of Systems     Chief Complaint   Patient presents with    6 Month Follow-Up     He is a 69 y.o. male who presents for evalution.     Reviewed PmHx, RxHx, FmHx, SocHx, AllgHx and updated and dated in the chart.    CURRENT MEDS W/ ASSOC DIAG           Start Date End Date     Ascorbic Acid (VITAMIN C) 500 MG tablet  --  --     Associated Diagnoses:  --     atorvastatin (LIPITOR) 40 MG tablet ()  10/01/24  03/30/25     Take 1 tablet by mouth daily     Associated Diagnoses:  Mixed hyperlipidemia     Echinacea 400 MG CAPS  --  --     Associated Diagnoses:  --     folic acid (FOLVITE) 400 MCG tablet  --  --     Associated Diagnoses:  --     GINKGO 60 MG TABS  --  --     Associated Diagnoses:  --     sildenafil